# Patient Record
Sex: FEMALE | Race: WHITE | NOT HISPANIC OR LATINO
[De-identification: names, ages, dates, MRNs, and addresses within clinical notes are randomized per-mention and may not be internally consistent; named-entity substitution may affect disease eponyms.]

---

## 2019-07-29 ENCOUNTER — APPOINTMENT (OUTPATIENT)
Dept: OTOLARYNGOLOGY | Facility: CLINIC | Age: 74
End: 2019-07-29
Payer: MEDICARE

## 2019-07-29 DIAGNOSIS — Z85.43 PERSONAL HISTORY OF MALIGNANT NEOPLASM OF OVARY: ICD-10-CM

## 2019-07-29 DIAGNOSIS — Z85.831 PERSONAL HISTORY OF MALIGNANT NEOPLASM OF SOFT TISSUE: ICD-10-CM

## 2019-07-29 PROCEDURE — 99204 OFFICE O/P NEW MOD 45 MIN: CPT

## 2019-07-29 PROCEDURE — 92567 TYMPANOMETRY: CPT

## 2019-07-29 PROCEDURE — 92557 COMPREHENSIVE HEARING TEST: CPT

## 2019-07-29 RX ORDER — DENOSUMAB 60 MG/ML
60 INJECTION SUBCUTANEOUS
Refills: 0 | Status: ACTIVE | COMMUNITY

## 2019-07-29 RX ORDER — CALCIUM CITRATE/VITAMIN D3 315MG-6.25
TABLET ORAL
Refills: 0 | Status: ACTIVE | COMMUNITY

## 2019-07-29 RX ORDER — METFORMIN HYDROCHLORIDE 625 MG/1
TABLET ORAL
Refills: 0 | Status: ACTIVE | COMMUNITY

## 2019-08-04 NOTE — REASON FOR VISIT
[Initial Consultation] : an initial consultation for [Family Member] : family member [FreeTextEntry2] : referred by Dr. Mays (ENT) for small mass pressing on the left inner ear

## 2019-08-04 NOTE — CONSULT LETTER
[Dear  ___] : Dear  [unfilled], [Consult Letter:] : I had the pleasure of evaluating your patient, [unfilled]. [Please see my note below.] : Please see my note below. [Consult Closing:] : Thank you very much for allowing me to participate in the care of this patient.  If you have any questions, please do not hesitate to contact me. [Sincerely,] : Sincerely, [FreeTextEntry2] : Sudha Mays MD  [FreeTextEntry3] : Herrera Hood MD, FACS\par Chair of Otolaryngology, [facility]\par Professor of Otolaryngology & Molecular Medicine, [employer] at [facility]\par \par

## 2019-08-04 NOTE — HISTORY OF PRESENT ILLNESS
[de-identified] : 74F here referred by Dr. Sudha Mays (ENT) for small mass pressing on the left inner ear- MRI (done 6/26/19) shows intracanalicular acoustic schwannoma. Pt notes a big difference in the hearing from the left vs. right hearing. 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 No history of metasteses.  Received cisplatnin in 2012 and repeated in 2015.  \par No vertigo - occasional tinnitus - saw Dr Mays for hearing loss - originally saw Anupama Joshi - referred to Dr. Mays who sent here. Grandmother and aunt with hearing loss.

## 2019-08-04 NOTE — DATA REVIEWED
[de-identified] : left asymmetric SNHL [de-identified] : left 4-6mm intracannilicular tumor of lateral IAC - near cochlea - zwanger disc reviewed and returned

## 2019-09-04 ENCOUNTER — TRANSCRIPTION ENCOUNTER (OUTPATIENT)
Age: 74
End: 2019-09-04

## 2020-01-30 ENCOUNTER — TRANSCRIPTION ENCOUNTER (OUTPATIENT)
Age: 75
End: 2020-01-30

## 2020-02-03 ENCOUNTER — APPOINTMENT (OUTPATIENT)
Dept: OTOLARYNGOLOGY | Facility: CLINIC | Age: 75
End: 2020-02-03
Payer: MEDICARE

## 2020-02-03 PROCEDURE — 92567 TYMPANOMETRY: CPT

## 2020-02-03 PROCEDURE — 99214 OFFICE O/P EST MOD 30 MIN: CPT

## 2020-02-03 PROCEDURE — 92557 COMPREHENSIVE HEARING TEST: CPT

## 2020-02-03 NOTE — DATA REVIEWED
[de-identified] : slight worsening of right ear [de-identified] : stable vestibular schwannoma by 1/2020 MRI

## 2020-02-03 NOTE — HISTORY OF PRESENT ILLNESS
[de-identified] : 74F here for f/u for hearing. Pt with h/o left asymmetric SNHL and small intracanicular schwannoma - pt obtained  hearing aids- pt feels it has been helpful- feels it lacks clarity when on the phone. Pt had MRI of Brain early this month- left intracanalicular VS is stable in size. \par \par Pt denies otalgia, otorrhea, ear infections, tinnitus, dizziness, vertigo or headaches related to hearing.

## 2020-09-30 ENCOUNTER — APPOINTMENT (OUTPATIENT)
Dept: OTOLARYNGOLOGY | Facility: CLINIC | Age: 75
End: 2020-09-30
Payer: MEDICARE

## 2020-09-30 PROCEDURE — 92557 COMPREHENSIVE HEARING TEST: CPT

## 2020-09-30 PROCEDURE — 92567 TYMPANOMETRY: CPT

## 2020-09-30 PROCEDURE — 99214 OFFICE O/P EST MOD 30 MIN: CPT

## 2020-09-30 NOTE — CONSULT LETTER
[Dear  ___] : Dear  [unfilled], [Courtesy Letter:] : I had the pleasure of seeing your patient, [unfilled], in my office today. [Please see my note below.] : Please see my note below. [Sincerely,] : Sincerely, [FreeTextEntry2] : Anupama Joshi, Fausto\par Sudha Mays MD [FreeTextEntry3] : Herrera Hood MD, FACS\par Chair of Otolaryngology, [facility]\par Professor of Otolaryngology & Molecular Medicine, [employer] at [facility]\par \par

## 2020-09-30 NOTE — REASON FOR VISIT
[Subsequent Evaluation] : a subsequent evaluation for [FreeTextEntry2] : follow up for asymmetrical SNHL and vestibular schwannoma

## 2020-09-30 NOTE — HISTORY OF PRESENT ILLNESS
[de-identified] : 75 year old female follow up for asymmetrical SNHL and vestibular schwannoma.  States no change in symptoms, reports having to increase volume of hearing aid of Left ear.  States Right ear, no change, doing well.  Patient denies otalgia, otorrhea, tinnitus, dizziness, vertigo, headaches related to hearing, fevers and ear infections.  Temperature WNL upon screening when entering facility.

## 2020-09-30 NOTE — PHYSICAL EXAM
[Midline] : trachea located in midline position [Normal] : no rashes [de-identified] : Obstructing cerumen removed AU using alligator, curette and suction - tolerated well - TMs normal post-removal.

## 2020-10-26 ENCOUNTER — APPOINTMENT (OUTPATIENT)
Dept: OTOLARYNGOLOGY | Facility: CLINIC | Age: 75
End: 2020-10-26
Payer: MEDICARE

## 2020-10-26 VITALS
HEART RATE: 71 BPM | SYSTOLIC BLOOD PRESSURE: 160 MMHG | HEIGHT: 61 IN | WEIGHT: 138 LBS | DIASTOLIC BLOOD PRESSURE: 88 MMHG | BODY MASS INDEX: 26.06 KG/M2

## 2020-10-26 PROCEDURE — 92567 TYMPANOMETRY: CPT

## 2020-10-26 PROCEDURE — 92557 COMPREHENSIVE HEARING TEST: CPT

## 2020-10-26 PROCEDURE — 99213 OFFICE O/P EST LOW 20 MIN: CPT

## 2020-10-26 NOTE — CONSULT LETTER
[Dear  ___] : Dear ~DUKE, [Courtesy Letter:] : I had the pleasure of seeing your patient, [unfilled], in my office today. [Please see my note below.] : Please see my note below. [Sincerely,] : Sincerely, [FreeTextEntry2] : Anupama Joshi, AuD [FreeTextEntry3] : Herrera Hood MD, FACS\par Chair of Otolaryngology, [facility]\par Professor of Otolaryngology & Molecular Medicine, [employer] [facility] [employer] at [facility]\par \par

## 2020-10-26 NOTE — DATA REVIEWED
[de-identified] : Right- -mild to severe mixed HL\par Left- -moderate to profound mixed HL\par Tymp\par Right- -Type C tympanogram consistent with excessive negative middle ear pressure\par Left- Type C/B- conductive pathology

## 2021-01-13 ENCOUNTER — APPOINTMENT (OUTPATIENT)
Dept: OTOLARYNGOLOGY | Facility: CLINIC | Age: 76
End: 2021-01-13

## 2021-01-25 ENCOUNTER — APPOINTMENT (OUTPATIENT)
Dept: OTOLARYNGOLOGY | Facility: CLINIC | Age: 76
End: 2021-01-25
Payer: MEDICARE

## 2021-01-25 VITALS
HEIGHT: 61 IN | WEIGHT: 140 LBS | HEART RATE: 76 BPM | BODY MASS INDEX: 26.43 KG/M2 | DIASTOLIC BLOOD PRESSURE: 90 MMHG | SYSTOLIC BLOOD PRESSURE: 157 MMHG

## 2021-01-25 PROCEDURE — 92567 TYMPANOMETRY: CPT

## 2021-01-25 PROCEDURE — 92557 COMPREHENSIVE HEARING TEST: CPT

## 2021-01-25 PROCEDURE — 99214 OFFICE O/P EST MOD 30 MIN: CPT

## 2021-01-25 RX ORDER — FLUTICASONE PROPIONATE 50 MCG
SPRAY, SUSPENSION NASAL
Refills: 0 | Status: DISCONTINUED | COMMUNITY
End: 2021-01-25

## 2021-01-25 RX ORDER — ROSUVASTATIN CALCIUM 20 MG/1
20 TABLET, FILM COATED ORAL
Refills: 0 | Status: ACTIVE | COMMUNITY

## 2021-01-25 NOTE — HISTORY OF PRESENT ILLNESS
[de-identified] : Patient with vestibular schwannoma and on recent audio hearing worse- denies change in hearing or vertigo/imbalance.

## 2021-10-15 ENCOUNTER — NON-APPOINTMENT (OUTPATIENT)
Age: 76
End: 2021-10-15

## 2021-10-25 ENCOUNTER — APPOINTMENT (OUTPATIENT)
Dept: OTOLARYNGOLOGY | Facility: CLINIC | Age: 76
End: 2021-10-25
Payer: MEDICARE

## 2021-10-25 VITALS — SYSTOLIC BLOOD PRESSURE: 133 MMHG | DIASTOLIC BLOOD PRESSURE: 82 MMHG

## 2021-10-25 DIAGNOSIS — H61.23 IMPACTED CERUMEN, BILATERAL: ICD-10-CM

## 2021-10-25 DIAGNOSIS — H90.6 MIXED CONDUCTIVE AND SENSORINEURAL HEARING LOSS, BILATERAL: ICD-10-CM

## 2021-10-25 PROCEDURE — 92567 TYMPANOMETRY: CPT

## 2021-10-25 PROCEDURE — 92557 COMPREHENSIVE HEARING TEST: CPT

## 2021-10-25 PROCEDURE — 99214 OFFICE O/P EST MOD 30 MIN: CPT | Mod: 25

## 2021-10-25 PROCEDURE — 69210 REMOVE IMPACTED EAR WAX UNI: CPT

## 2021-10-25 NOTE — REASON FOR VISIT
[Subsequent Evaluation] : a subsequent evaluation for [FreeTextEntry2] : follow up monitoring stable vestibular schwannoma, bilateral hearing loss L>R

## 2021-10-25 NOTE — HISTORY OF PRESENT ILLNESS
[de-identified] : 76 year old female follow up for hearing and left stable vestibular schwannoma-  recent MRI shows stable VS.  Wearing bilateral hearing aids. States feels no change in hearing.  No vertigo.

## 2021-10-25 NOTE — DATA REVIEWED
[de-identified] : Right- -mild to severe sensorineural hearing loss\par Left- -moderate to profound mixed HL\par Tymp- -Type C tympanogram bilaterally, consistent with excessive negative middle ear pressure\par

## 2021-10-25 NOTE — DATA REVIEWED
[de-identified] : AD Mild to severe SNHL\par AD Moderate to profound MHL\par  [de-identified] : MRI - stable vestibular schwannoma

## 2021-10-25 NOTE — PHYSICAL EXAM
[Midline] : trachea located in midline position [Normal] : no rashes [de-identified] : wax removed with difficulty AU - Tms normal

## 2021-10-25 NOTE — HISTORY OF PRESENT ILLNESS
[de-identified] : 75 year old female follow up monitoring stable vestibular schwannoma, bilateral hearing loss L>R .  Wearing bilateral hearing aids.  States feels no change in hearing. Sees cardiology for HTN  -

## 2021-11-22 ENCOUNTER — TRANSCRIPTION ENCOUNTER (OUTPATIENT)
Age: 76
End: 2021-11-22

## 2022-08-17 ENCOUNTER — NON-APPOINTMENT (OUTPATIENT)
Age: 77
End: 2022-08-17

## 2022-09-28 ENCOUNTER — APPOINTMENT (OUTPATIENT)
Dept: OTOLARYNGOLOGY | Facility: CLINIC | Age: 77
End: 2022-09-28

## 2022-09-28 VITALS
DIASTOLIC BLOOD PRESSURE: 84 MMHG | WEIGHT: 140 LBS | HEART RATE: 72 BPM | HEIGHT: 61 IN | BODY MASS INDEX: 26.43 KG/M2 | SYSTOLIC BLOOD PRESSURE: 136 MMHG

## 2022-09-28 DIAGNOSIS — D33.3 BENIGN NEOPLASM OF CRANIAL NERVES: ICD-10-CM

## 2022-09-28 DIAGNOSIS — H90.3 SENSORINEURAL HEARING LOSS, BILATERAL: ICD-10-CM

## 2022-09-28 PROCEDURE — 99214 OFFICE O/P EST MOD 30 MIN: CPT

## 2022-09-28 RX ORDER — FLUOCINOLONE ACETONIDE 0.11 MG/ML
0.01 OIL AURICULAR (OTIC)
Qty: 1 | Refills: 2 | Status: ACTIVE | COMMUNITY
Start: 2022-09-28 | End: 1900-01-01

## 2022-10-13 ENCOUNTER — TRANSCRIPTION ENCOUNTER (OUTPATIENT)
Age: 77
End: 2022-10-13

## 2022-10-20 PROBLEM — D33.3 VESTIBULAR SCHWANNOMA: Status: ACTIVE | Noted: 2019-07-29

## 2022-10-20 PROBLEM — H90.3 ASNHL (ASYMMETRICAL SENSORINEURAL HEARING LOSS): Status: ACTIVE | Noted: 2019-07-29

## 2022-10-20 NOTE — HISTORY OF PRESENT ILLNESS
[de-identified] : 77 year old woman, annual follow up for vestibular schwannoma. History of clogged ears, bilateral cerumen impactions and bilateral mixed hearing loss - wears hearing aids.  Recent audio at Dr. Joshi's office ([place], [place].)  States ear cleaned while in office, hearing aids functioning properly but still not hearing well from Left ear.  Patient denies otalgia, otorrhea, tinnitus, dizziness, vertigo, headaches related to hearing, recent fevers and ear infections. No recent imaging studies.

## 2022-10-20 NOTE — PHYSICAL EXAM
[Midline] : trachea located in midline position [Normal] : no rashes [de-identified] : wax removed with difficulty AU - dry  - Tms normal

## 2022-10-25 ENCOUNTER — NON-APPOINTMENT (OUTPATIENT)
Age: 77
End: 2022-10-25

## 2023-10-02 ENCOUNTER — APPOINTMENT (OUTPATIENT)
Dept: OTOLARYNGOLOGY | Facility: CLINIC | Age: 78
End: 2023-10-02